# Patient Record
Sex: MALE | Race: WHITE | Employment: FULL TIME | URBAN - NONMETROPOLITAN AREA
[De-identification: names, ages, dates, MRNs, and addresses within clinical notes are randomized per-mention and may not be internally consistent; named-entity substitution may affect disease eponyms.]

---

## 2021-11-04 NOTE — PROGRESS NOTES
MEDICAL ONCOLOGY CONSULTATION    Pt Name: Marianne Roblero  MRN: [id number]  YOB: 1990  Date of evaluation: 11/8/2021    REASON FOR CONSULTATION:  Von Willebrand disease  REQUESTING PHYSICIAN:  Rafal Mercado PA-C    History Obtained From:  patient and old medical records    HISTORY OF PRESENT ILLNESS:    Diagnosis  · Mild type 1 von Willebrand disease    Treatment Summary  · PRN Stimate    Hematology History  Marianne Roblero has been seen by my partner Dr. Charley Suresh and by myself for a history of type I von Willebrand disease. This was diagnosed on 2/2/01. He has been seen by me in the past but was again lost to follow-up since March 2018. The patient is now planning for a oral procedure and tooth extraction. von Willebrand disease history:  Julio Johnson has a documented history of mild Type 1 von Willebrand disease since the age of 6. Julio Johnson denies any significant bleeding episodes since the age of 2 and reports only having complaint of occasional left-ided nose bleeds that only occur in the humidity and if his nare becomes dry. The following are pertinent events related to the diagnosis of von Willebrand disease. · 1992-laceration to his foot after stepping on glass with excessive bleeding  · 1993- accident and with excessive bleeding. · 12/1/2000-Multimeric analysis of plasma von Willebrand factor shows a normal pattern and distribution. · 2/2/2001-diagnosed with mild Type 1 von Willebrand diease after experiencing an uncontrolled left-sided nosebleed. He was followed by the hematology/oncology clinic at [facility] in [place], [place]. Von Willebrand factor AG 93% (%), von Willebrand factor activity 242% (%) and factor VIII activity, 49% (%). · 1/28/2004-lab results documented: platelet function assay: Epi 210 second &  seconds (H); Ristocetin activity 58%;vWD antigen 69%.   · 12/28/2004-Established care with Dr. Sachin Lopez and lost to follow-up after 6/3/2008. DDAVP was the only treatment provided. · 3/6/2018 Re-establishment of care with hematology  · VWD labs repeated and compatible with type 1.  · 5/22/18 Last seen. Lost to follow-up  · 11/8/2021he was again seen by me. He is scheduled for dental procedure. We will repeat panel today. The patient has DDAVP nasal spray at home. We will contact him back regarding results and recommendations. Ramona Live reports no significant bleeding events other than occasional nosebleed. We will repeat serology studies to confirm von Willebrand diagnosis. Past Medical History:    · GERD  · VW type I  · Hyperlipidemia  Past Surgical History:    · Anticipated dental extraction  Social History:    Marital status:Single   Smoking status:Former Smoker; 8 years   ETOH status: Occasional   [facility]ides: [facility]    Family History:   Family History   Problem Relation Age of Onset    Cancer Mother 52        Barretts Esophageal     Cancer Paternal Grandfather         Leukemia    Cancer Paternal Great Grandmother         Melanoma       Current Hospital Medications:    Current Outpatient Medications   Medication Sig Dispense Refill    rosuvastatin (CRESTOR) 10 MG tablet 10 mg daily      omeprazole (PRILOSEC) 20 MG delayed release capsule 20 mg daily      loratadine (CLARITIN) 10 MG tablet 10 mg daily       No current facility-administered medications for this visit. Allergies:    Allergies   Allergen Reactions    Aspirin          Subjective   REVIEW OF SYSTEMS:   CONSTITUTIONAL: no fever, no night sweats, no fatigue, loss of appetite;  HEENT: no blurring of vision, no double vision, no hearing difficulty, no tinnitus, no ulceration, no dysplasia, no epistaxis;  LUNGS: no cough, no hemoptysis, no wheeze, shortness of breath upon exertion;  CARDIOVASCULAR: no palpitation, no chest pain, shortness of breath;  GI: no abdominal pain,abdominal bloating, no nausea, no vomiting, no diarrhea, no constipation, bloody stools,  MARYANA: no dysuria, no hematuria, no frequency or urgency, no nephrolithiasis;  MUSCULOSKELETAL:  joint pain, no swelling, no stiffness;  ENDOCRINE: no polyuria, no polydipsia, cold intolerance;  HEMATOLOGY: no easy bruising or bleeding, no history of clotting disorder;  DERMATOLOGY: no skin rash, no eczema, no pruritus;  PSYCHIATRY: no depression, no anxiety, no panic attacks, no suicidal ideation, no homicidal ideation;  NEUROLOGY: no syncope, no seizures, no numbness or tingling of hands, no numbness or tingling of feet, no paresis,poor concentration;  Objective   /70   Pulse 83   Ht 6' 2\" (1.88 m)   Wt 264 lb (119.7 kg)   SpO2 97%   BMI 33.90 kg/m²     PHYSICAL EXAM:  CONSTITUTIONAL: Alert, appropriate, no acute distress  EYES: Non icteric, EOM intact, pupils equal round   ENT: Mucus membranes moist, no oral pharyngeal lesions, external inspection of ears and nose are normal  NECK: Supple, no masses. No palpable thyroid mass  CHEST/LUNGS: CTA bilaterally, normal respiratory effort   CARDIOVASCULAR: RRR, no murmurs. No lower extremity edema  ABDOMEN: soft non-tender, active bowel sounds, no HSM. No palpable masses  EXTREMITIES: warm, full ROM in all 4 extremities, no focal weakness. SKIN: warm, dry with no rashes or lesions  LYMPH: No cervical, clavicular, axillary, or inguinal lymphadenopathy  NEUROLOGIC: follows commands, non focal   PSYCH: mood and affect appropriate.   Alert and oriented to time, place, person      LABORATORY RESULTS REVIEWED/ANALYZED BY ME:  11/8/21 CBC  WBC 8.34  HGB 14.6  HCT 44.1    Neut 4.54    RADIOLOGY STUDIES REVIEWED BY ME:  None  ASSESSMENT:    Orders Placed This Encounter   Procedures    Von Willebrand Panel     Standing Status:   Future     Standing Expiration Date:   11/8/2022    Comprehensive Metabolic Panel     Standing Status:   Future     Number of Occurrences:   1     Standing Expiration Date:   11/8/2022    Protime-INR     Standing Status:   Future     Standing Expiration Date:   2022     Order Specific Question:   Daily Coumadin Dose? Answer:   none    APTT     Standing Status:   Future     Standing Expiration Date:   2022     Order Specific Question:   Daily Heparin Dose? Answer:   none      Alex Mena was seen today for new patient. Diagnoses and all orders for this visit:    [place] [facility] disease ([address]  -     Von Willebrand Panel; Future  -     Comprehensive Metabolic Panel; Future  -     Protime-INR; Future  -     APTT; Future    Care plan discussed with patient    Pre-procedure lab exam         Diagnosis  · Mild type 1 von Willebrand disease    Treatment Summary  · PRN Elizabeth Dyson symptoms of reflux have improved with Prilosec and his considering putting surgery on hold. Patient planning dental procedure with his dentist.  We will recheck von Willebrand panel today. Patient has DDAVP at home. BC today showed unremarkable CBC with normal platelet counts 461,549. Normal hemoglobin 14.6 and normal WBC. PT, PTTpending  Von Willebrand panelpending  Further recommendations to follow up on results of von Willebrand levels    2021  Factor VIII activity49%  Von Willebrand antigen47%  Von Willebrand ristpocetin factor33%  PT 9.7/INR 0.9 APTT 30    Recommendations for prevention of bleeding for tooth extraction    Intranasal: Note: If used for prevention of surgical bleeding, administer 2 hours before procedure. Using high-concentration spray (1.5 mg/mL):  Patient weight ?50 k mcg (1 spray) in each nostril (total dose: 300 mcg). Repeat doses: For treatment of minor bleeding, dose may be repeated after 8 to 12 hours and once daily thereafter, if needed, based on the patient's clinical condition and von Willebrand factor and factor VIII activity levels; duration of use is generally limited to 3 to 5 days due to tachyphylaxis.       PLAN:  RTC with MD 2022 in [address] [place] [address] surgery  CBC, CMP, von Willebrand panel, PT, PTT today  We will convey recommendations to oral surgeon      Juhi Noble, kelley scribing for Shruti Cotton MD. Electronically signed by Ashley Zelaya MA on 11/8/2021 at 11:37 AM CST. I, Dr Em Malik, personally performed the services described in this documentation as scribed by Ashley Zelaya MA in my presence and is both accurate and complete. I have seen, examined and reviewed this patient medication list, appropriate labs and imaging studies. I reviewed relevant medical records and others physicians notes. I discussed the plans of care with the patient. I answered all the questions to the patients satisfaction. I have also reviewed the chief complaint (CC) and part of the history (History of Present Illness (HPI), Past Family Social History [facility]), or Review of Systems (ROS) and made changes when appropriated. (Please note that portions of this note were completed with a voice recognition program. Efforts were made to edit the dictations but occasionally words are mis-transcribed.)  The total time (40min) I spent to see the patient today includes at least one or more of the following: preparing to see the patient by reviewing prior tests, prior notes or other relevant information, performing appropriate independent examination and evaluation, counseling, ordering of medications, tests communicating with other healthcare professionals when appropriated to coordinate care, documenting clinic information in the electronic medical record or other health records, independently interpreting results of tests, managing test results and communicating the results to the patient/family or caregiver.

## 2021-11-05 DIAGNOSIS — D68.00 VON WILLEBRAND'S DISEASE: Primary | ICD-10-CM

## 2021-11-08 ENCOUNTER — OFFICE VISIT (OUTPATIENT)
Dept: HEMATOLOGY | Age: 31
End: 2021-11-08
Payer: COMMERCIAL

## 2021-11-08 ENCOUNTER — HOSPITAL ENCOUNTER (OUTPATIENT)
Dept: INFUSION THERAPY | Age: 31
Discharge: HOME | End: 2021-11-08
Payer: COMMERCIAL

## 2021-11-08 VITALS
DIASTOLIC BLOOD PRESSURE: 70 MMHG | HEART RATE: 83 BPM | HEIGHT: 74 IN | SYSTOLIC BLOOD PRESSURE: 118 MMHG | BODY MASS INDEX: 33.88 KG/M2 | OXYGEN SATURATION: 97 % | WEIGHT: 264 LBS

## 2021-11-08 DIAGNOSIS — Z71.89 CARE PLAN DISCUSSED WITH PATIENT: ICD-10-CM

## 2021-11-08 DIAGNOSIS — D68.00 VON WILLEBRAND'S DISEASE: ICD-10-CM

## 2021-11-08 DIAGNOSIS — D68.00 VON WILLEBRAND'S DISEASE: Primary | ICD-10-CM

## 2021-11-08 DIAGNOSIS — Z01.812 PRE-PROCEDURE LAB EXAM: ICD-10-CM

## 2021-11-08 LAB
ALBUMIN SERPL-MCNC: 4.9 G/DL (ref 3.5–5.2)
ALP BLD-CCNC: 97 U/L (ref 40–130)
ALT SERPL-CCNC: 26 U/L (ref 21–72)
ANION GAP SERPL CALCULATED.3IONS-SCNC: 11 MMOL/L (ref 7–19)
AST SERPL-CCNC: 29 U/L (ref 17–59)
BASOPHILS ABSOLUTE: 0.03 K/UL (ref 0.01–0.08)
BASOPHILS RELATIVE PERCENT: 0.4 % (ref 0.1–1.2)
BILIRUB SERPL-MCNC: 0.6 MG/DL (ref 0.2–1.3)
BUN BLDV-MCNC: 16 MG/DL (ref 9–20)
CALCIUM SERPL-MCNC: 9.8 MG/DL (ref 8.4–10.2)
CHLORIDE BLD-SCNC: 105 MMOL/L (ref 98–111)
CO2: 29 MMOL/L (ref 22–29)
CREAT SERPL-MCNC: 0.9 MG/DL (ref 0.6–1.2)
EOSINOPHILS ABSOLUTE: 0.35 K/UL (ref 0.04–0.54)
EOSINOPHILS RELATIVE PERCENT: 4.2 % (ref 0.7–7)
GFR NON-AFRICAN AMERICAN: >60
GLOBULIN: 3 G/DL
GLUCOSE BLD-MCNC: 139 MG/DL (ref 74–106)
HCT VFR BLD CALC: 44.1 % (ref 40.1–51)
HEMOGLOBIN: 14.6 G/DL (ref 13.7–17.5)
LYMPHOCYTES ABSOLUTE: 2.75 K/UL (ref 1.18–3.74)
LYMPHOCYTES RELATIVE PERCENT: 33 % (ref 19.3–53.1)
MCH RBC QN AUTO: 29.6 PG (ref 25.7–32.2)
MCHC RBC AUTO-ENTMCNC: 33.1 G/DL (ref 32.3–36.5)
MCV RBC AUTO: 89.3 FL (ref 79–92.2)
MONOCYTES ABSOLUTE: 0.67 K/UL (ref 0.24–0.82)
MONOCYTES RELATIVE PERCENT: 8 % (ref 4.7–12.5)
NEUTROPHILS ABSOLUTE: 4.54 K/UL (ref 1.56–6.13)
NEUTROPHILS RELATIVE PERCENT: 54.4 % (ref 34–71.1)
PDW BLD-RTO: 13.2 % (ref 11.6–14.4)
PLATELET # BLD: 280 K/UL (ref 163–337)
PMV BLD AUTO: 10.1 FL (ref 7.4–10.4)
POTASSIUM SERPL-SCNC: 4.3 MMOL/L (ref 3.5–5.1)
RBC # BLD: 4.94 M/UL (ref 4.63–6.08)
SODIUM BLD-SCNC: 145 MMOL/L (ref 137–145)
TOTAL PROTEIN: 7.9 G/DL (ref 6.3–8.2)
WBC # BLD: 8.34 K/UL (ref 4.23–9.07)

## 2021-11-08 PROCEDURE — 80053 COMPREHEN METABOLIC PANEL: CPT

## 2021-11-08 PROCEDURE — 85025 COMPLETE CBC W/AUTO DIFF WBC: CPT

## 2021-11-08 PROCEDURE — 99202 OFFICE O/P NEW SF 15 MIN: CPT

## 2021-11-08 PROCEDURE — 99203 OFFICE O/P NEW LOW 30 MIN: CPT | Performed by: INTERNAL MEDICINE

## 2021-11-08 PROCEDURE — 36415 COLL VENOUS BLD VENIPUNCTURE: CPT

## 2021-11-08 RX ORDER — ROSUVASTATIN CALCIUM 10 MG/1
10 TABLET, COATED ORAL DAILY
COMMUNITY

## 2021-11-08 RX ORDER — OMEPRAZOLE 20 MG/1
20 CAPSULE, DELAYED RELEASE ORAL DAILY
COMMUNITY
Start: 2021-08-30

## 2021-11-08 RX ORDER — LORATADINE 10 MG/1
10 TABLET ORAL DAILY
COMMUNITY
Start: 2021-08-30

## 2021-11-11 ENCOUNTER — CLINICAL DOCUMENTATION (OUTPATIENT)
Dept: HEMATOLOGY | Age: 31
End: 2021-11-11

## 2021-11-11 NOTE — PROGRESS NOTES
Mirtha Segura has a diagnosis of type I mild von Willebrand disease  He is going to undergo tooth extraction. 2021 labs  Factor VIII activity-49%  Von Willebrand antigen-47%  Von Willebrand ristpocetin factor-33%  PT 9.7/INR 0.9 APTT 30    Recommendations for prevention of bleeding for tooth extraction    Intranasal DDAVP(Stimate):   Note: If used for prevention of surgical bleeding, administer 2 hours before procedure. Using high-concentration spray (1.5 mg/mL):  Patient weight ?50 k mcg (1 spray) in each nostril (total dose: 300 mcg). Repeat doses: For treatment of minor bleeding, dose may be repeated after 8 to 12 hours and once daily thereafter, if needed, based on the patient's clinical condition and von Willebrand factor and factor VIII activity levels; duration of use is generally limited to 3 to 5 days due to tachyphylaxis. Recommendations will be explained to the patient. We will also fax a copy of  this note to his provider (Gerardo Liu).

## 2022-01-14 PROBLEM — D68.00 VON WILLEBRAND DISEASE: Status: ACTIVE | Noted: 2022-01-14
